# Patient Record
Sex: FEMALE
[De-identification: names, ages, dates, MRNs, and addresses within clinical notes are randomized per-mention and may not be internally consistent; named-entity substitution may affect disease eponyms.]

---

## 2017-12-06 ENCOUNTER — MEDICATION RENEWAL (OUTPATIENT)
Age: 47
End: 2017-12-06

## 2018-02-01 ENCOUNTER — APPOINTMENT (OUTPATIENT)
Dept: ENDOCRINOLOGY | Facility: CLINIC | Age: 48
End: 2018-02-01
Payer: MEDICAID

## 2018-02-01 VITALS
DIASTOLIC BLOOD PRESSURE: 71 MMHG | SYSTOLIC BLOOD PRESSURE: 107 MMHG | OXYGEN SATURATION: 95 % | BODY MASS INDEX: 27.23 KG/M2 | HEIGHT: 62 IN | WEIGHT: 148 LBS | TEMPERATURE: 97.7 F | HEART RATE: 64 BPM

## 2018-02-01 DIAGNOSIS — E55.9 VITAMIN D DEFICIENCY, UNSPECIFIED: ICD-10-CM

## 2018-02-01 PROCEDURE — 99214 OFFICE O/P EST MOD 30 MIN: CPT

## 2018-02-09 ENCOUNTER — CLINICAL ADVICE (OUTPATIENT)
Age: 48
End: 2018-02-09

## 2018-02-09 LAB
25(OH)D3 SERPL-MCNC: 42.6 NG/ML
ALBUMIN SERPL ELPH-MCNC: 4.8 G/DL
ALP BLD-CCNC: 54 U/L
ALT SERPL-CCNC: 23 U/L
ANION GAP SERPL CALC-SCNC: 15 MMOL/L
AST SERPL-CCNC: 29 U/L
BILIRUB SERPL-MCNC: 0.4 MG/DL
BUN SERPL-MCNC: 11 MG/DL
CALCIUM SERPL-MCNC: 10 MG/DL
CHLORIDE SERPL-SCNC: 103 MMOL/L
CHOLEST SERPL-MCNC: 243 MG/DL
CHOLEST/HDLC SERPL: 2.6 RATIO
CO2 SERPL-SCNC: 28 MMOL/L
CREAT SERPL-MCNC: 0.46 MG/DL
FSH SERPL-MCNC: 63.6 IU/L
GLUCOSE SERPL-MCNC: 74 MG/DL
HDLC SERPL-MCNC: 93 MG/DL
LDLC SERPL CALC-MCNC: 110 MG/DL
POTASSIUM SERPL-SCNC: 4.7 MMOL/L
PROLACTIN SERPL-MCNC: 8.6 NG/ML
PROT SERPL-MCNC: 7.7 G/DL
SODIUM SERPL-SCNC: 146 MMOL/L
T4 FREE SERPL-MCNC: 1 NG/DL
TRIGL SERPL-MCNC: 198 MG/DL
TSH SERPL-ACNC: 0.37 UIU/ML

## 2018-05-31 ENCOUNTER — RX RENEWAL (OUTPATIENT)
Age: 48
End: 2018-05-31

## 2018-07-26 ENCOUNTER — RX RENEWAL (OUTPATIENT)
Age: 48
End: 2018-07-26

## 2018-08-24 ENCOUNTER — RX RENEWAL (OUTPATIENT)
Age: 48
End: 2018-08-24

## 2018-08-27 ENCOUNTER — RX RENEWAL (OUTPATIENT)
Age: 48
End: 2018-08-27

## 2019-02-11 ENCOUNTER — RX RENEWAL (OUTPATIENT)
Age: 49
End: 2019-02-11

## 2019-02-28 ENCOUNTER — MEDICATION RENEWAL (OUTPATIENT)
Age: 49
End: 2019-02-28

## 2019-03-15 ENCOUNTER — APPOINTMENT (OUTPATIENT)
Dept: ENDOCRINOLOGY | Facility: CLINIC | Age: 49
End: 2019-03-15
Payer: COMMERCIAL

## 2019-03-15 VITALS
TEMPERATURE: 99 F | OXYGEN SATURATION: 97 % | DIASTOLIC BLOOD PRESSURE: 63 MMHG | HEIGHT: 63 IN | WEIGHT: 151 LBS | BODY MASS INDEX: 26.75 KG/M2 | SYSTOLIC BLOOD PRESSURE: 99 MMHG | HEART RATE: 62 BPM

## 2019-03-15 PROCEDURE — 99214 OFFICE O/P EST MOD 30 MIN: CPT

## 2019-03-15 NOTE — REVIEW OF SYSTEMS
[Fatigue] : no fatigue [Decreased Appetite] : appetite not decreased [Fever] : no fever [Chills] : no chills [FreeTextEntry2] : Has gained 2 lb in weight since her last visit

## 2019-03-15 NOTE — HISTORY OF PRESENT ILLNESS
[FreeTextEntry1] : She now returns after a hiatus of a year.\par Interim history is significant for surgery to remove the soft tissue mass on the right side of her abdominal wall.  She did not go for her post-op visit, and is not sure what kind of tissue it was.\par Says that her BP dropped significantly after the procedure, and has remained low since then--reports these as consistently < 125/80.  She had one spike ??? to 130/112.  She has continued all of her usual medications.\par Her "spells" with episodic sweating, etc have disappeared.  Her feelings of hypervigilance have also disappeared.\par She is no longer able to see her PCP because Dr. Blandon no longer takes her insurance.\par She has continued on the [place] thyroid, though is now paying out of pocket because her insurance will no longer cover it.\par She has not made any significant changes in her diet.\par \par \par

## 2019-03-15 NOTE — REASON FOR VISIT
[Follow-Up: _____] : a [unfilled] follow-up visit [FreeTextEntry1] : hypothyroidism, hypertension and pituitary adenoma

## 2019-03-20 ENCOUNTER — CLINICAL ADVICE (OUTPATIENT)
Age: 49
End: 2019-03-20

## 2019-03-20 LAB
ALBUMIN SERPL ELPH-MCNC: 4.9 G/DL
ALP BLD-CCNC: 56 U/L
ALT SERPL-CCNC: 15 U/L
ANION GAP SERPL CALC-SCNC: 15 MMOL/L
AST SERPL-CCNC: 17 U/L
BILIRUB SERPL-MCNC: 0.2 MG/DL
BUN SERPL-MCNC: 10 MG/DL
CALCIUM SERPL-MCNC: 9.7 MG/DL
CHLORIDE SERPL-SCNC: 102 MMOL/L
CHOLEST SERPL-MCNC: 266 MG/DL
CHOLEST/HDLC SERPL: 3.1 RATIO
CO2 SERPL-SCNC: 26 MMOL/L
CREAT SERPL-MCNC: 0.58 MG/DL
GLUCOSE SERPL-MCNC: 88 MG/DL
HDLC SERPL-MCNC: 87 MG/DL
LDLC SERPL CALC-MCNC: 136 MG/DL
POTASSIUM SERPL-SCNC: 5.1 MMOL/L
PROLACTIN SERPL-MCNC: 4.7 NG/ML
PROT SERPL-MCNC: 7.1 G/DL
SODIUM SERPL-SCNC: 143 MMOL/L
T4 FREE SERPL-MCNC: 0.9 NG/DL
TRIGL SERPL-MCNC: 215 MG/DL
TSH SERPL-ACNC: 1.66 UIU/ML

## 2019-04-01 ENCOUNTER — RX RENEWAL (OUTPATIENT)
Age: 49
End: 2019-04-01

## 2019-04-10 ENCOUNTER — MEDICATION RENEWAL (OUTPATIENT)
Age: 49
End: 2019-04-10

## 2019-12-03 ENCOUNTER — MEDICATION RENEWAL (OUTPATIENT)
Age: 49
End: 2019-12-03

## 2020-02-24 ENCOUNTER — APPOINTMENT (OUTPATIENT)
Dept: ENDOCRINOLOGY | Facility: CLINIC | Age: 50
End: 2020-02-24

## 2020-06-30 ENCOUNTER — APPOINTMENT (OUTPATIENT)
Dept: ENDOCRINOLOGY | Facility: CLINIC | Age: 50
End: 2020-06-30
Payer: SELF-PAY

## 2020-06-30 VITALS
DIASTOLIC BLOOD PRESSURE: 100 MMHG | SYSTOLIC BLOOD PRESSURE: 164 MMHG | HEART RATE: 86 BPM | WEIGHT: 151 LBS | BODY MASS INDEX: 26.75 KG/M2 | HEIGHT: 63 IN | OXYGEN SATURATION: 92 %

## 2020-06-30 DIAGNOSIS — Z83.49 FAMILY HISTORY OF OTHER ENDOCRINE, NUTRITIONAL AND METABOLIC DISEASES: ICD-10-CM

## 2020-06-30 DIAGNOSIS — Z80.0 FAMILY HISTORY OF MALIGNANT NEOPLASM OF DIGESTIVE ORGANS: ICD-10-CM

## 2020-06-30 DIAGNOSIS — Z83.71 FAMILY HISTORY OF COLONIC POLYPS: ICD-10-CM

## 2020-06-30 DIAGNOSIS — E28.319 ASYMPTOMATIC PREMATURE MENOPAUSE: ICD-10-CM

## 2020-06-30 DIAGNOSIS — F17.200 NICOTINE DEPENDENCE, UNSPECIFIED, UNCOMPLICATED: ICD-10-CM

## 2020-06-30 PROCEDURE — 99214 OFFICE O/P EST MOD 30 MIN: CPT

## 2020-07-04 PROBLEM — E28.319 PREMATURE MENOPAUSE: Status: ACTIVE | Noted: 2020-07-04

## 2020-07-04 PROBLEM — Z83.71 FAMILY HISTORY OF COLONIC POLYPS: Status: ACTIVE | Noted: 2020-07-04

## 2020-07-04 PROBLEM — Z80.0 FAMILY HISTORY OF MALIGNANT NEOPLASM OF COLON: Status: ACTIVE | Noted: 2020-07-04

## 2020-07-04 PROBLEM — Z83.49 FAMILY HISTORY OF HASHIMOTO THYROIDITIS: Status: ACTIVE | Noted: 2020-07-04

## 2020-07-04 PROBLEM — F17.200 CURRENT SMOKER: Status: ACTIVE | Noted: 2020-07-04

## 2020-07-04 RX ORDER — METOPROLOL TARTRATE 50 MG/1
50 TABLET, FILM COATED ORAL TWICE DAILY
Qty: 30 | Refills: 11 | Status: DISCONTINUED | COMMUNITY
Start: 2017-12-06 | End: 2020-07-04

## 2020-07-04 RX ORDER — AMLODIPINE BESYLATE 10 MG/1
10 TABLET ORAL TWICE DAILY
Qty: 30 | Refills: 1 | Status: DISCONTINUED | COMMUNITY
Start: 2017-12-06 | End: 2020-07-04

## 2020-07-04 RX ORDER — LOSARTAN POTASSIUM 100 MG/1
100 TABLET, FILM COATED ORAL
Qty: 30 | Refills: 9 | Status: DISCONTINUED | COMMUNITY
Start: 2017-12-06 | End: 2020-07-04

## 2020-07-04 NOTE — HISTORY OF PRESENT ILLNESS
[FreeTextEntry1] : 49-year-old woman who is followed for hypothyroidism, hypertension, hyperlipidemia and a pituitary microadenoma.  The pituitary lesion is 4 mm in size, non-secretory and stable on serial MRIs for the past several years.  Her hypothyroidism is being treated with [place] thyroid, and her hyperlipidemia is sub-optimally controlled with diet.\par \par She now returns after a hiatus of a year.\par Interim history since her last visit:\par --Stopped all of her anti-hypertensives as of September of last year, and says that her BPs were fine during the few weeks after she stopped.  She then lapsed on her monitoring, however, and has actually not checked her BP in several months.  She has not had any recurrence of her previous "spells" which were sometimes associated with spikes in her BP, and thinks that this has probably made her somewhat less concerned.\par --She has not seen a PCP nor had bloodwork done in the past year.\par --She never found out the results of the pathology from the soft tissue mass which was removed from her abdominal wall.\par --Feels quite well, and has been exercising regularly--does resistance work with weights, bands, etc for 40 min every day.  Does not do any outdoor running or other cardio.\par --Has been amenorrheic for 8 years, and has no vasomotor symptoms\par Taking [place] thyroid at least an hour before breakfast.\par Denies any headaches or visual disturbances.  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

## 2020-07-04 NOTE — REVIEW OF SYSTEMS
[Dry Eyes] : dryness [Fatigue] : no fatigue [Decreased Appetite] : appetite not decreased [Recent Weight Gain (___ Lbs)] : no recent weight gain [Recent Weight Loss (___ Lbs)] : no recent weight loss [Fever] : no fever [Chills] : no chills [Blurred Vision] : no blurred vision [Eyes Itch] : no itch [Hearing Loss] : no hearing loss  [Dysphagia] : no dysphagia [Nasal Congestion] : no nasal congestion [Lower Ext Edema] : no lower extremity edema [Chest Pain] : no chest pain [Palpitations] : no palpitations [Cough] : no cough [Shortness Of Breath] : no shortness of breath [Wheezing] : no wheezing [SOB on Exertion] : no shortness of breath on exertion [Nausea] : no nausea [Constipation] : no constipation [Heartburn] : no heartburn [Diarrhea] : no diarrhea [Dysuria] : no dysuria [Nocturia] : no nocturia [Polyuria] : no polyuria [Myalgia] : no myalgia  [Muscle Cramps] : no muscle cramps [Dry Skin] : no dry skin [Hair Loss] : no hair loss [Headaches] : no headaches [Pain/Numbness of Digits] : no pain/numbness of digits [Tremors] : no tremors [Insomnia] : no insomnia [Depression] : no depression [Easy Bleeding] : no ~M tendency for easy bleeding [Polydipsia] : no polydipsia [Stress] : no stress [FreeTextEntry9] : R knee tends to swell a few hours after she runs outdoors, so she has stopped this [Easy Bruising] : no tendency for easy bruising

## 2020-07-04 NOTE — PHYSICAL EXAM
Next OV 05/14/2019  Jj 01/24/2019  Contract 10/30/2018   [Alert] : alert [Well Nourished] : well nourished [Normal Sclera/Conjunctiva] : normal sclera/conjunctiva [Healthy Appearance] : healthy appearance [EOMI] : extra ocular movement intact [PERRL] : pupils equal, round and reactive to light [No Lid Lag] : no lid lag [Normal Outer Ear/Nose] : the ears and nose were normal in appearance [No Proptosis] : no proptosis [No Neck Mass] : no neck mass was observed [No LAD] : no lymphadenopathy [Normal Hearing] : hearing was normal [Clear to Auscultation] : lungs were clear to auscultation bilaterally [Regular Rhythm] : with a regular rhythm [Carotids Normal] : carotid pulses were normal with no bruits [Normal Rate] : heart rate was normal [No Edema] : no peripheral edema [Not Tender] : non-tender [No HSM] : no hepato-splenomegaly [Soft] : abdomen soft [Normal Supraclavicular Nodes] : no supraclavicular lymphadenopathy [Normal Anterior Cervical Nodes] : no anterior cervical lymphadenopathy [No CVA Tenderness] : no ~M costovertebral angle tenderness [Normal Gait] : normal gait [No Joint Swelling] : no joint swelling seen [No Involuntary Movements] : no involuntary movements were seen [No Rash] : no rash [Normal Strength/Tone] : muscle strength and tone were normal [No Tremors] : no tremors [Normal Mood] : the mood was normal [Kyphosis] : no kyphosis present [Acanthosis Nigricans] : no acanthosis nigricans [Foot Ulcers] : no foot ulcers [de-identified] : Visual fields entirely normal to confrontational testing.  Small nodular lesion on the left lower lid which the pt says was a "mole" around which she tied thread to "choke it off" [Normal Affect] : the affect was normal [de-identified] : Thyroid barely palpable [de-identified] : Short IRISH localized to the aortic area [de-identified] : DP pulses 3+ bilaterally [de-identified] : No scalp hair thinning [de-identified] : Vibratory sensation mildly decreased over the toes

## 2020-07-04 NOTE — ASSESSMENT
[FreeTextEntry1] : 1) Hypothyroidism:  She is clinically euthyroid on exam, but has not had TFTs done in the past year.\par --Repeat TFTs today\par --Continue [place] thyroid 120 mg/day pending results\par \par 2) Hypertension:  BP is distinctly elevated on today's exam, and her hypertension has likely persisted off medication.  She clearly should not have discontinued her home monitoring, but presumably did so because her "spells" which were previously associated with spikes in her BP seem to have spontaneously disappeared.  These episodes were not always associated with BP spikes, however, and she also noted elevated BP readings even when she was not having any symptoms.\par --Warned her that it is highly unlikely that her HBP has been "cured," and that she needs to resume monitoring.  Warned her re: risks of CVA, renal damage and accelerated age-related decline in cognitive function.\par \par 3) Hyperlipidemia:  Diet is fairly low in saturated fat, though her intake of eggs is somewhat excessive, and may be affecting her LDL-cholesterol.\par --Repeat lipid profile today\par \par 4) Pituitary adenoma:  Appears to be non-secretory by previous testing, and was stable at 4 mm in size on her most recent MRI in 2016.\par --Would ordinarily repeat her MRI at this point, but she wants to defer due to the cost and her lack of health insurance\par \par Bloodwork to be drawn today--see below\par She is to call back next week to discuss the results and update me regarding her BPs\par See for follow-up in 6-12 months.\par She also needs a screening colonoscopy--partly because of her age, but more because of the history of colon polyps in her mother.  She also declines this due to cost. [FreeTextEntry2] : Diet, home BP monitoring

## 2020-07-14 LAB
ALBUMIN SERPL ELPH-MCNC: 5 G/DL
ALP BLD-CCNC: 66 U/L
ALT SERPL-CCNC: 29 U/L
ANION GAP SERPL CALC-SCNC: 17 MMOL/L
AST SERPL-CCNC: 30 U/L
BASOPHILS # BLD AUTO: 0.04 K/UL
BASOPHILS NFR BLD AUTO: 0.7 %
BILIRUB SERPL-MCNC: 0.3 MG/DL
BUN SERPL-MCNC: 5 MG/DL
CALCIUM SERPL-MCNC: 9.8 MG/DL
CHLORIDE SERPL-SCNC: 107 MMOL/L
CHOLEST SERPL-MCNC: 249 MG/DL
CHOLEST/HDLC SERPL: 3 RATIO
CO2 SERPL-SCNC: 23 MMOL/L
CREAT SERPL-MCNC: 0.46 MG/DL
EOSINOPHIL # BLD AUTO: 0.15 K/UL
EOSINOPHIL NFR BLD AUTO: 2.7 %
GLUCOSE SERPL-MCNC: 99 MG/DL
HCT VFR BLD CALC: 47.1 %
HDLC SERPL-MCNC: 82 MG/DL
HGB BLD-MCNC: 15.9 G/DL
IMM GRANULOCYTES NFR BLD AUTO: 0.4 %
LDLC SERPL CALC-MCNC: 131 MG/DL
LYMPHOCYTES # BLD AUTO: 1.78 K/UL
LYMPHOCYTES NFR BLD AUTO: 31.5 %
MAN DIFF?: NORMAL
MCHC RBC-ENTMCNC: 32.5 PG
MCHC RBC-ENTMCNC: 33.8 GM/DL
MCV RBC AUTO: 96.3 FL
MONOCYTES # BLD AUTO: 0.37 K/UL
MONOCYTES NFR BLD AUTO: 6.5 %
NEUTROPHILS # BLD AUTO: 3.29 K/UL
NEUTROPHILS NFR BLD AUTO: 58.2 %
PLATELET # BLD AUTO: 200 K/UL
POTASSIUM SERPL-SCNC: 4.8 MMOL/L
PROT SERPL-MCNC: 7.1 G/DL
RBC # BLD: 4.89 M/UL
RBC # FLD: 11.8 %
SODIUM SERPL-SCNC: 147 MMOL/L
TRIGL SERPL-MCNC: 181 MG/DL
TSH SERPL-ACNC: 0.28 UIU/ML
WBC # FLD AUTO: 5.65 K/UL

## 2020-07-27 ENCOUNTER — RX RENEWAL (OUTPATIENT)
Age: 50
End: 2020-07-27

## 2021-07-13 DIAGNOSIS — Z00.00 ENCOUNTER FOR GENERAL ADULT MEDICAL EXAMINATION W/OUT ABNORMAL FINDINGS: ICD-10-CM

## 2021-07-19 ENCOUNTER — APPOINTMENT (OUTPATIENT)
Dept: ENDOCRINOLOGY | Facility: CLINIC | Age: 51
End: 2021-07-19
Payer: SELF-PAY

## 2021-07-19 PROCEDURE — 36415 COLL VENOUS BLD VENIPUNCTURE: CPT

## 2021-07-20 LAB
ALBUMIN SERPL ELPH-MCNC: 4.9 G/DL
ALP BLD-CCNC: 70 U/L
ALT SERPL-CCNC: 34 U/L
ANION GAP SERPL CALC-SCNC: 16 MMOL/L
AST SERPL-CCNC: 35 U/L
BASOPHILS # BLD AUTO: 0.04 K/UL
BASOPHILS NFR BLD AUTO: 0.6 %
BILIRUB SERPL-MCNC: 0.4 MG/DL
BUN SERPL-MCNC: 5 MG/DL
CALCIUM SERPL-MCNC: 9.7 MG/DL
CHLORIDE SERPL-SCNC: 104 MMOL/L
CHOLEST SERPL-MCNC: 239 MG/DL
CO2 SERPL-SCNC: 22 MMOL/L
CREAT SERPL-MCNC: 0.5 MG/DL
EOSINOPHIL # BLD AUTO: 0.18 K/UL
EOSINOPHIL NFR BLD AUTO: 2.7 %
GLUCOSE SERPL-MCNC: 89 MG/DL
HCT VFR BLD CALC: 47 %
HDLC SERPL-MCNC: 73 MG/DL
HGB BLD-MCNC: 15.5 G/DL
IMM GRANULOCYTES NFR BLD AUTO: 0.2 %
LDLC SERPL CALC-MCNC: 145 MG/DL
LYMPHOCYTES # BLD AUTO: 2.32 K/UL
LYMPHOCYTES NFR BLD AUTO: 34.9 %
MAN DIFF?: NORMAL
MCHC RBC-ENTMCNC: 32.2 PG
MCHC RBC-ENTMCNC: 33 GM/DL
MCV RBC AUTO: 97.5 FL
MONOCYTES # BLD AUTO: 0.43 K/UL
MONOCYTES NFR BLD AUTO: 6.5 %
NEUTROPHILS # BLD AUTO: 3.67 K/UL
NEUTROPHILS NFR BLD AUTO: 55.1 %
NONHDLC SERPL-MCNC: 166 MG/DL
PLATELET # BLD AUTO: 205 K/UL
POTASSIUM SERPL-SCNC: 4.7 MMOL/L
PROT SERPL-MCNC: 7 G/DL
RBC # BLD: 4.82 M/UL
RBC # FLD: 12.2 %
SODIUM SERPL-SCNC: 142 MMOL/L
T4 FREE SERPL-MCNC: 1 NG/DL
TRIGL SERPL-MCNC: 103 MG/DL
TSH SERPL-ACNC: 0.18 UIU/ML
WBC # FLD AUTO: 6.65 K/UL

## 2021-07-21 ENCOUNTER — APPOINTMENT (OUTPATIENT)
Dept: ENDOCRINOLOGY | Facility: CLINIC | Age: 51
End: 2021-07-21
Payer: SELF-PAY

## 2021-07-21 VITALS
SYSTOLIC BLOOD PRESSURE: 143 MMHG | WEIGHT: 152 LBS | TEMPERATURE: 97.7 F | BODY MASS INDEX: 26.93 KG/M2 | HEART RATE: 76 BPM | DIASTOLIC BLOOD PRESSURE: 90 MMHG | HEIGHT: 63 IN | OXYGEN SATURATION: 95 %

## 2021-07-21 PROCEDURE — 99214 OFFICE O/P EST MOD 30 MIN: CPT

## 2021-07-25 NOTE — REVIEW OF SYSTEMS
[Eyes Itch] : itch [Dry Skin] : dry skin [Stress] : stress [Fatigue] : no fatigue [Decreased Appetite] : appetite not decreased [Recent Weight Gain (___ Lbs)] : no recent weight gain [Recent Weight Loss (___ Lbs)] : no recent weight loss [Fever] : no fever [Chills] : no chills [Dry Eyes] : no dryness [Blurred Vision] : no blurred vision [Dysphagia] : no dysphagia [Hearing Loss] : no hearing loss  [Chest Pain] : no chest pain [Palpitations] : no palpitations [Lower Ext Edema] : no lower extremity edema [Shortness Of Breath] : no shortness of breath [Cough] : no cough [Wheezing] : no wheezing [SOB on Exertion] : no shortness of breath on exertion [Nausea] : no nausea [Constipation] : no constipation [Heartburn] : no heartburn [Diarrhea] : no diarrhea [Polyuria] : no polyuria [Dysuria] : no dysuria [Nocturia] : no nocturia [Joint Pain] : no joint pain [Muscle Weakness] : no muscle weakness [Myalgia] : no myalgia  [Joint Stiffness] : no joint stiffness [Hair Loss] : no hair loss [Ulcer] : no ulcer [Headaches] : no headaches [Tremors] : no tremors [Pain/Numbness of Digits] : no pain/numbness of digits [Depression] : no depression [Anxiety] : no anxiety [Polydipsia] : no polydipsia [Cold Intolerance] : no cold intolerance [Heat Intolerance] : no heat intolerance [Easy Bleeding] : no ~M tendency for easy bleeding [Easy Bruising] : no tendency for easy bruising

## 2021-07-25 NOTE — HISTORY OF PRESENT ILLNESS
[FreeTextEntry1] : 49-year-old woman who is followed for hypothyroidism, hypertension, hyperlipidemia and a pituitary microadenoma.  The pituitary lesion is 4 mm in size, non-secretory and stable on serial MRIs for the past several years.  Her hypothyroidism is being treated with [place] thyroid, and her hyperlipidemia is sub-optimally controlled with diet.\par \par She now returns after a hiatus of a year.  \par Her only intercurrent medical problems were a ?? abscess on the left side of her face (above her mouth) and a pustule on her left ear lobe (which healed after it drained spontaneously).\par Has been taking the losartan (50 mg once a day), but had not been checking any BPs until the past week.  The two readings which she took were 125/85 and 115/80.\par Her weight is about the same.\par Taking her thyroid medication a full hour before breakfast\par Ciet reviewed:\par --Breakfast is eggs and sauteed vegetables\par --Second meal is at 4 PM.  Protein at supper is fish 4-5X/week, otherwise tofu.  Has salad and/or cooked vegetables, but no starch.\par --Has yogurt most days as part of her salad dressing\par --Does not snack in the evening\par --does not eat any baked goods or frozen desserts\par Received both doses of (Pfizer) COVD vaccine in April.  Had only fatigue after her second injection.\par Exercise is 1 hour on a "gazelle" (the equivalent of a Nordic-Track) plus 30 min of calisthenics\par Still smoking 1/2 ppd.\par \par

## 2021-07-25 NOTE — ASSESSMENT
[FreeTextEntry1] : 1) Hypothyroidism:\par \par 2) Hyperlipidemia\par \par 3) Hypertension\par \par 4) Family history of colon polyps

## 2022-04-19 ENCOUNTER — RX RENEWAL (OUTPATIENT)
Age: 52
End: 2022-04-19

## 2022-07-20 ENCOUNTER — RX RENEWAL (OUTPATIENT)
Age: 52
End: 2022-07-20

## 2022-07-25 ENCOUNTER — APPOINTMENT (OUTPATIENT)
Dept: ENDOCRINOLOGY | Facility: CLINIC | Age: 52
End: 2022-07-25

## 2022-07-25 ENCOUNTER — APPOINTMENT (OUTPATIENT)
Dept: INTERNAL MEDICINE | Facility: CLINIC | Age: 52
End: 2022-07-25

## 2022-07-25 PROCEDURE — 36415 COLL VENOUS BLD VENIPUNCTURE: CPT

## 2022-07-26 LAB
BASOPHILS # BLD AUTO: 0.04 K/UL
BASOPHILS NFR BLD AUTO: 0.7 %
EOSINOPHIL # BLD AUTO: 0.15 K/UL
EOSINOPHIL NFR BLD AUTO: 2.6 %
HCT VFR BLD CALC: 44 %
HGB BLD-MCNC: 14.8 G/DL
IMM GRANULOCYTES NFR BLD AUTO: 0.2 %
LYMPHOCYTES # BLD AUTO: 2.01 K/UL
LYMPHOCYTES NFR BLD AUTO: 35.1 %
MAN DIFF?: NORMAL
MCHC RBC-ENTMCNC: 33 PG
MCHC RBC-ENTMCNC: 33.6 GM/DL
MCV RBC AUTO: 98.2 FL
MONOCYTES # BLD AUTO: 0.34 K/UL
MONOCYTES NFR BLD AUTO: 5.9 %
NEUTROPHILS # BLD AUTO: 3.17 K/UL
NEUTROPHILS NFR BLD AUTO: 55.5 %
PLATELET # BLD AUTO: 161 K/UL
RBC # BLD: 4.48 M/UL
RBC # FLD: 12.2 %
WBC # FLD AUTO: 5.72 K/UL

## 2022-07-27 ENCOUNTER — APPOINTMENT (OUTPATIENT)
Dept: ENDOCRINOLOGY | Facility: CLINIC | Age: 52
End: 2022-07-27

## 2022-07-29 ENCOUNTER — APPOINTMENT (OUTPATIENT)
Dept: ENDOCRINOLOGY | Facility: CLINIC | Age: 52
End: 2022-07-29

## 2022-07-29 PROCEDURE — 36415 COLL VENOUS BLD VENIPUNCTURE: CPT

## 2022-08-01 LAB
ALBUMIN SERPL ELPH-MCNC: 5 G/DL
ALP BLD-CCNC: 62 U/L
ALT SERPL-CCNC: 22 U/L
ANION GAP SERPL CALC-SCNC: 15 MMOL/L
AST SERPL-CCNC: 26 U/L
BILIRUB SERPL-MCNC: 0.3 MG/DL
BUN SERPL-MCNC: 7 MG/DL
CALCIUM SERPL-MCNC: 9.4 MG/DL
CHLORIDE SERPL-SCNC: 104 MMOL/L
CHOLEST SERPL-MCNC: 276 MG/DL
CO2 SERPL-SCNC: 26 MMOL/L
CREAT SERPL-MCNC: 0.49 MG/DL
EGFR: 113 ML/MIN/1.73M2
GLUCOSE SERPL-MCNC: 93 MG/DL
HDLC SERPL-MCNC: 93 MG/DL
LDLC SERPL CALC-MCNC: 154 MG/DL
NONHDLC SERPL-MCNC: 183 MG/DL
POTASSIUM SERPL-SCNC: 4.7 MMOL/L
PROT SERPL-MCNC: 7 G/DL
SODIUM SERPL-SCNC: 145 MMOL/L
T4 FREE SERPL-MCNC: 1 NG/DL
TRIGL SERPL-MCNC: 145 MG/DL
TSH SERPL-ACNC: 0.95 UIU/ML

## 2022-08-09 ENCOUNTER — APPOINTMENT (OUTPATIENT)
Dept: ENDOCRINOLOGY | Facility: CLINIC | Age: 52
End: 2022-08-09

## 2022-08-09 VITALS
WEIGHT: 146 LBS | DIASTOLIC BLOOD PRESSURE: 97 MMHG | OXYGEN SATURATION: 98 % | SYSTOLIC BLOOD PRESSURE: 142 MMHG | HEART RATE: 94 BPM | RESPIRATION RATE: 16 BRPM | HEIGHT: 63 IN | TEMPERATURE: 97.7 F | BODY MASS INDEX: 25.87 KG/M2

## 2022-08-09 DIAGNOSIS — Z80.0 FAMILY HISTORY OF MALIGNANT NEOPLASM OF DIGESTIVE ORGANS: ICD-10-CM

## 2022-08-09 DIAGNOSIS — F17.210 NICOTINE DEPENDENCE, CIGARETTES, UNCOMPLICATED: ICD-10-CM

## 2022-08-09 DIAGNOSIS — D35.2 BENIGN NEOPLASM OF PITUITARY GLAND: ICD-10-CM

## 2022-08-09 PROCEDURE — 99214 OFFICE O/P EST MOD 30 MIN: CPT

## 2022-08-09 NOTE — ASSESSMENT
[FreeTextEntry1] : 1) Hypothyroidism:  TSH level is in the optimal range of 0.4-2.5 uU/ml on a full (120 mg) [place] thyroid tablet          7 days/week.\par --Continue 120 mg daily\par --Explained that her "not feeling right" on the days when she took half a pill probably had to do with her missing the  "activating" effect of the large amount of T3 in the preparation\par \par 2) Hyperlipidemia:  LDL-cholesterol target should probably be regarded as 100 mg% given her risk factors of hypertension and cigarette smoking.  She is therefore well above goal, and does not have significant room for further dietary modification.  She is a candidate for statin therapy, but does not want to take medication.\par --Again discussed the LDL target of 100 mg%, and my recommendation that she start on a statin.  Emphasized that although her HDL level is quite high, it may not be as protective as it seems.  She remains reluctant to take a stating.\par \par 3) Hypertension:  BP is mildly elevated at today's visit, but is also similar to the values which she has been getting on her machine at home.\par --Explained that she is above the goal of < 140/85, and the risks of stroke, potential acceleration of age-related cognitive decline, etc.  She is willing to start back on medication.\par Plans discussed in detail:\par --Will start back on losartan at 50 mg/day\par --She will let me know if any side-effects--(i.e. recurrence of the arthralgias, etc)\par --If she remains above goal at 50 mg/day, will increase to 50 mg BID.\par --If still unsuccessful, will decrease back to 50 mg/day and add chlorthalidone 12.5 mg/day\par \par 4) Pituitary microadenoma:  Last MRI several years ago showed a stable 4 mm microadenoma.  Previous hormonal work-up showed the tumor to be clinically and biochemically non-secretory.  \par --Although she would ordinarily be a candidate for an updated MRI, the cost of the study is an issue\par \par 5) Polycythemia: Hb level on the current bloodwork (14.8 gm/dl) is only slightly above the normal female range.\par --Continue to follow\par \par 6) Family history of colon CA:  Has never had a colonoscopy, and cannot do one because of the out-of-pocket cost.\par --Will consider doing Cologard as an alternative\par --Will see if GI Dept has a program for reduced-cost procedures\par \par See for follow-up in 1 year.  CMP, lipids, TFTs, CBC before the visit [FreeTextEntry2] : BP and lipid targets

## 2022-08-09 NOTE — DATA REVIEWED
[FreeTextEntry1] : [facility] labs  (7/25/22, 7/29/22)\par \par Glucose (non-fasting)  93\par CMP WNL\par , HDL 93, \par TSH 0.95 uU/ml  (0.27-4.2)

## 2022-08-09 NOTE — PHYSICAL EXAM
[Alert] : alert [Well Nourished] : well nourished [Healthy Appearance] : healthy appearance [PERRL] : pupils equal, round and reactive to light [EOMI] : extra ocular movement intact [No Proptosis] : no proptosis [No Lid Lag] : no lid lag [Normal Outer Ear/Nose] : the ears and nose were normal in appearance [Normal Hearing] : hearing was normal [No Neck Mass] : no neck mass was observed [No LAD] : no lymphadenopathy [No Thyroid Nodules] : no palpable thyroid nodules [Clear to Auscultation] : lungs were clear to auscultation bilaterally [No Murmurs] : no murmurs [Normal Rate] : heart rate was normal [Regular Rhythm] : with a regular rhythm [Carotids Normal] : carotid pulses were normal with no bruits [No Edema] : no peripheral edema [Not Tender] : non-tender [No HSM] : no hepato-splenomegaly [Normal Supraclavicular Nodes] : no supraclavicular lymphadenopathy [Normal Anterior Cervical Nodes] : no anterior cervical lymphadenopathy [No CVA Tenderness] : no ~M costovertebral angle tenderness [Normal Gait] : normal gait [No Involuntary Movements] : no involuntary movements were seen [No Joint Swelling] : no joint swelling seen [Normal Strength/Tone] : muscle strength and tone were normal [No Rash] : no rash [No Motor Deficits] : the motor exam was normal [No Tremors] : no tremors [Normal Affect] : the affect was normal [Normal Mood] : the mood was normal [Kyphosis] : no kyphosis present [Acanthosis Nigricans] : no acanthosis nigricans [Foot Ulcers] : no foot ulcers [Hirsutism] : no hirsutism [de-identified] : Mild conjunctival injection OU.  Visual fields normal to confrontational testing [de-identified] : Thyroid barely palpable [de-identified] : DP pulses 3+ bilaterally [de-identified] : Exam limited by muscle tension.  No hepatomegaly by percussion [de-identified] : Vibratory sensation mildly decreased over the toes

## 2022-08-09 NOTE — REVIEW OF SYSTEMS
[Eyes Itch] : itch [Fatigue] : no fatigue [Decreased Appetite] : appetite not decreased [Fever] : no fever [Chills] : no chills [Dry Eyes] : no dryness [Blurred Vision] : no blurred vision [Dysphagia] : no dysphagia [Hearing Loss] : no hearing loss  [Chest Pain] : no chest pain [Palpitations] : no palpitations [Lower Ext Edema] : no lower extremity edema [Shortness Of Breath] : no shortness of breath [Cough] : no cough [Wheezing] : no wheezing [SOB on Exertion] : no shortness of breath on exertion [Nausea] : no nausea [Constipation] : no constipation [Heartburn] : no heartburn [Diarrhea] : no diarrhea [Polyuria] : no polyuria [Dysuria] : no dysuria [Nocturia] : no nocturia [Joint Pain] : no joint pain [Muscle Weakness] : no muscle weakness [Myalgia] : no myalgia  [Joint Stiffness] : no joint stiffness [Dry Skin] : no dry skin [Hair Loss] : no hair loss [Ulcer] : no ulcer [Headaches] : no headaches [Tremors] : no tremors [Pain/Numbness of Digits] : no pain/numbness of digits [Depression] : no depression [Anxiety] : no anxiety [Stress] : no stress [Polydipsia] : no polydipsia [Cold Intolerance] : no cold intolerance [Heat Intolerance] : no heat intolerance [Easy Bleeding] : no ~M tendency for easy bleeding [Easy Bruising] : no tendency for easy bruising [FreeTextEntry2] : Has lost 6 lb since her last visit [FreeTextEntry7] : Had diarrhea while on the antibiotics for her dental infection, but bowel pattern is now back to normal [FreeTextEntry8] : Has been amenorrheic since her early 40s [FreeTextEntry9] : Swelling and pain in her knees has resolved [de-identified] : Has a "nodule" on her mid-back which has been present for several months

## 2022-08-09 NOTE — HISTORY OF PRESENT ILLNESS
[FreeTextEntry1] : 51-year-old woman who is followed for hypothyroidism, hypertension, hyperlipidemia and a pituitary microadenoma.  The pituitary lesion is 4 mm in size, non-secretory and has been stable on serial MRIs for the past several years.  Her hypothyroidism is being treated with [place] thyroid, and her hyperlipidemia is sub-optimally controlled with diet.\par \par She now returns after a hiatus of a year.  \par Interim history since her last visit is significant primarily for extensive dental work.  Had a chronic dental abscess for which she had not sought care because of the pandemic.  She finally had it drained, but needed 6 weeks of antibiotic therapy because of the extent of the abscess.  She also required multiple tooth extractions, and had placement of a permanent bridge rather than implants.  She has not seen any physicians since her last visit here.  Her last Gyn exam was over 5 years ago.\lindsey Has been taking a full  [place] thyroid tablet 7 days/week--(i.e.not the half pill on Sunday).  Says that she did not feel right on the day that she took the half pill\par She also stopped the losartan because she thought it was causing arthritis in her knees.  (She actually had swelling in both knees, and thought that she had visible swelling in her hips).\par BPs at home have been mostly less than 140 systolic, and almost never over 150.  Diastolics have been 88-97.  Says that her readings were no different when she was on the losartan..\lindsey Has lost 6 lb since her last visit, though does not think that her diet has changed..\par --Is still pescatarian.  Also avoids all dairy except for yogurt\par --Breakfast is 2 eggs and roasted vegetables (with tahini)\par --Lunch (which is the main meal of the day) is soup, fish, sometimes tuna.  Starch usually is included in the soup, otherwise will have bread or a potato\par --Will often just have a snack instead of third meal--hummus with celery sticks, sandwich with vegetables/tahini.\par --Has fruit twice a day--most recently watermelon, leila or dragon fruit\par Still smoking 1/2-1 ppd\lindsey Has never had a colonoscopy despite the family history of colon CA (grandmother) and colon polyps (mother)

## 2022-08-15 ENCOUNTER — NON-APPOINTMENT (OUTPATIENT)
Age: 52
End: 2022-08-15

## 2022-11-01 ENCOUNTER — NON-APPOINTMENT (OUTPATIENT)
Age: 52
End: 2022-11-01

## 2022-11-08 ENCOUNTER — RX RENEWAL (OUTPATIENT)
Age: 52
End: 2022-11-08

## 2023-09-28 ENCOUNTER — APPOINTMENT (OUTPATIENT)
Dept: ENDOCRINOLOGY | Facility: CLINIC | Age: 53
End: 2023-09-28
Payer: SELF-PAY

## 2023-09-28 VITALS
DIASTOLIC BLOOD PRESSURE: 101 MMHG | SYSTOLIC BLOOD PRESSURE: 147 MMHG | HEART RATE: 82 BPM | WEIGHT: 149 LBS | OXYGEN SATURATION: 98 % | HEIGHT: 63 IN | BODY MASS INDEX: 26.4 KG/M2 | TEMPERATURE: 97.6 F

## 2023-09-28 DIAGNOSIS — E03.9 HYPOTHYROIDISM, UNSPECIFIED: ICD-10-CM

## 2023-09-28 DIAGNOSIS — I10 ESSENTIAL (PRIMARY) HYPERTENSION: ICD-10-CM

## 2023-09-28 DIAGNOSIS — D75.1 SECONDARY POLYCYTHEMIA: ICD-10-CM

## 2023-09-28 DIAGNOSIS — E78.2 MIXED HYPERLIPIDEMIA: ICD-10-CM

## 2023-09-28 PROCEDURE — 99213 OFFICE O/P EST LOW 20 MIN: CPT

## 2023-09-28 RX ORDER — LOSARTAN POTASSIUM 100 MG/1
100 TABLET, FILM COATED ORAL
Qty: 50 | Refills: 4 | Status: ACTIVE | COMMUNITY
Start: 2020-07-14 | End: 1900-01-01

## 2023-09-28 RX ORDER — CHLORTHALIDONE 25 MG/1
25 TABLET ORAL
Qty: 30 | Refills: 10 | Status: ACTIVE | COMMUNITY
Start: 2022-08-22 | End: 1900-01-01

## 2023-09-28 RX ORDER — THYROID, PORCINE 120 MG/1
120 TABLET ORAL
Qty: 30 | Refills: 10 | Status: ACTIVE | COMMUNITY
Start: 2017-12-06 | End: 1900-01-01

## 2023-10-01 PROBLEM — I10 ESSENTIAL HYPERTENSION: Status: ACTIVE | Noted: 2017-12-06

## 2023-10-01 PROBLEM — E78.2 MIXED HYPERLIPIDEMIA: Status: ACTIVE | Noted: 2018-02-01

## 2023-10-01 PROBLEM — E03.9 PRIMARY HYPOTHYROIDISM: Status: ACTIVE | Noted: 2017-12-06

## 2023-10-01 PROBLEM — D75.1 POLYCYTHEMIA: Status: ACTIVE | Noted: 2022-07-18

## 2024-08-29 ENCOUNTER — APPOINTMENT (OUTPATIENT)
Dept: ENDOCRINOLOGY | Facility: CLINIC | Age: 54
End: 2024-08-29
Payer: SELF-PAY

## 2024-08-29 VITALS
OXYGEN SATURATION: 94 % | SYSTOLIC BLOOD PRESSURE: 139 MMHG | TEMPERATURE: 96.5 F | BODY MASS INDEX: 26.4 KG/M2 | DIASTOLIC BLOOD PRESSURE: 93 MMHG | HEART RATE: 91 BPM | WEIGHT: 149 LBS | HEIGHT: 63 IN

## 2024-08-29 DIAGNOSIS — E78.2 MIXED HYPERLIPIDEMIA: ICD-10-CM

## 2024-08-29 DIAGNOSIS — I10 ESSENTIAL (PRIMARY) HYPERTENSION: ICD-10-CM

## 2024-08-29 DIAGNOSIS — D75.1 SECONDARY POLYCYTHEMIA: ICD-10-CM

## 2024-08-29 DIAGNOSIS — R73.03 PREDIABETES.: ICD-10-CM

## 2024-08-29 DIAGNOSIS — D35.2 BENIGN NEOPLASM OF PITUITARY GLAND: ICD-10-CM

## 2024-08-29 DIAGNOSIS — E03.9 HYPOTHYROIDISM, UNSPECIFIED: ICD-10-CM

## 2024-08-29 PROCEDURE — 99214 OFFICE O/P EST MOD 30 MIN: CPT

## 2024-09-02 PROBLEM — R73.03 PRE-DIABETES: Status: ACTIVE | Noted: 2024-08-29

## 2024-09-03 NOTE — REVIEW OF SYSTEMS
[Stress] : stress [Fatigue] : no fatigue [Decreased Appetite] : appetite not decreased [Recent Weight Gain (___ Lbs)] : no recent weight gain [Recent Weight Loss (___ Lbs)] : no recent weight loss [Fever] : no fever [Chills] : no chills [Dry Eyes] : no dryness [Blurred Vision] : no blurred vision [Eyes Itch] : no itch [Dysphagia] : no dysphagia [Hearing Loss] : no hearing loss  [Chest Pain] : no chest pain [Palpitations] : no palpitations [Lower Ext Edema] : no lower extremity edema [Shortness Of Breath] : no shortness of breath [Cough] : no cough [Wheezing] : no wheezing [SOB on Exertion] : no shortness of breath on exertion [Nausea] : no nausea [Constipation] : no constipation [Heartburn] : no heartburn [Diarrhea] : no diarrhea [Polyuria] : no polyuria [Dysuria] : no dysuria [Nocturia] : no nocturia [Joint Pain] : no joint pain [Muscle Weakness] : no muscle weakness [Myalgia] : no myalgia  [Joint Stiffness] : no joint stiffness [Dry Skin] : no dry skin [Hair Loss] : no hair loss [Ulcer] : no ulcer [Headaches] : no headaches [Tremors] : no tremors [Pain/Numbness of Digits] : no pain/numbness of digits [Depression] : no depression [Anxiety] : no anxiety [Polydipsia] : no polydipsia [Cold Intolerance] : no cold intolerance [Heat Intolerance] : no heat intolerance [Easy Bleeding] : no ~M tendency for easy bleeding [Easy Bruising] : no tendency for easy bruising [FreeTextEntry7] : Tends to eat multiple small meals 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

## 2024-09-03 NOTE — HISTORY OF PRESENT ILLNESS
[FreeTextEntry1] : 53-year-old woman who is followed for hypothyroidism, hypertension, hyperlipidemia and a pituitary microadenoma.  The pituitary lesion is 4 mm in size, non-secretory and has been stable on serial MRIs for the past several years.  Her hypothyroidism is being treated with [place] thyroid, and her hyperlipidemia is sub-optimally controlled with diet.  Her hypertension is somewhat labile, and although she tends to have elevated readings at office visits, her readings at home are now in target range.      She now returns for her yearly visit. Has not had any acute illnesses or significant medical issues in the interim.   Is supposed to be taking the [place] thyroid 6 1/2 pills/week, but is still taking 1 pill 7 days/week.  Says that she would tend to fall asleep in the afternoon on the days when she tried to take half a tablet Taking chlorthalidone 12.5 mg plus losartan 50 mg/day for her hypertension.  Says that most of her BPs (which she checks on a watch) have been < 130/80, and often in the 110-120/65-75 range.  Readings have been higher during the past 2 seeks, but still below 140/83. Diet was reviewed:: --Breakfast is cooked vegetables with 1 egg. --Lunch is a salad with grilled salmon --Supper is usually leftover salad from lunch --Fruit servings average less than one a day, and she does not drink juice Changed her exercise routine.  Stopped running on the treadmill because she developed pain in her hip and upper thigh, also some swelling over the patella.  Is now doing mostly calisthenics (pushups, crunches, etc) plus yoga and exercises with hand weights.

## 2024-09-03 NOTE — PHYSICAL EXAM
[Alert] : alert [Well Nourished] : well nourished [Healthy Appearance] : healthy appearance [Normal Sclera/Conjunctiva] : normal sclera/conjunctiva [EOMI] : extra ocular movement intact [PERRL] : pupils equal, round and reactive to light [No Proptosis] : no proptosis [No Lid Lag] : no lid lag [Normal Outer Ear/Nose] : the ears and nose were normal in appearance [Normal Hearing] : hearing was normal [No Neck Mass] : no neck mass was observed [No LAD] : no lymphadenopathy [No Thyroid Nodules] : no palpable thyroid nodules [Clear to Auscultation] : lungs were clear to auscultation bilaterally [No Murmurs] : no murmurs [Normal Rate] : heart rate was normal [Regular Rhythm] : with a regular rhythm [Carotids Normal] : carotid pulses were normal with no bruits [No Edema] : no peripheral edema [Not Tender] : non-tender [Soft] : abdomen soft [No HSM] : no hepato-splenomegaly [Normal Supraclavicular Nodes] : no supraclavicular lymphadenopathy [Normal Anterior Cervical Nodes] : no anterior cervical lymphadenopathy [No CVA Tenderness] : no ~M costovertebral angle tenderness [Normal Gait] : normal gait [No Involuntary Movements] : no involuntary movements were seen [No Joint Swelling] : no joint swelling seen [Normal Strength/Tone] : muscle strength and tone were normal [No Rash] : no rash [No Motor Deficits] : the motor exam was normal [No Tremors] : no tremors [Normal Affect] : the affect was normal [Normal Mood] : the mood was normal [Kyphosis] : no kyphosis present [Acanthosis Nigricans] : no acanthosis nigricans [Foot Ulcers] : no foot ulcers [Hirsutism] : no hirsutism [de-identified] : No corneal arcus.  Visual fields normal to confrontational testing [de-identified] : Thyroid is barely palpable [de-identified] : DP pulses 2+ on the left, only faintly palpable on the right [de-identified] : Area under the MT head of her R foot where she has a foreign body sensation is heavily callused

## 2024-09-03 NOTE — DATA REVIEWED
[FreeTextEntry1] : [facility]  (8/23/24)  CMP--, otherwise normal , HDL 70, TG 76 CBC WNL TSH level not done (though was on the requisition)

## 2024-09-03 NOTE — ASSESSMENT
[FreeTextEntry2] : Implications of elevated FBS, BP targets, LDL target and possible statin therapy [FreeTextEntry1] : 1) Pre-diabetes:  FBS has risen to 125 mg%, actually at the threshold for true diabetes.  No A1c level is available.  Family history is negative.  Her diet is fine, she exercises regularly, and she is non-obese.   --To continue diet and exercise --Will check an A1c level  2) Hyperlipidemia:  She now has a "hard" LDL target of 100 mg% given the pre-diabetes, (although this target was previously justified by her risk factors of hypertension and cigarette smoking).  Her diet leaves almost no room for additional modification, and she is clearly a candidate for statin therapy. --Discussed her LDL target and indication for statin therapy, which she has previously refused.  She says that she will think about it.  3) Hypothyroidism:  TFTs were not done on the current labwork, but were added via a phone call to [facility] last night. --Will await the result of her TSH level, and discuss this with her next week over the phone. --Explained to her that it is very difficult to "fine-tune" her regimen, since the T3 content of the [place] preparation is quite high, and she may feel somewhat different on a day when she takes a lower dose.  4) Hypertension:  BP was again elevated at her office visit, though her readings at home seem to be excellent.  She is monitoring these with her watch, but says that she has confirmed the accuracy of that device by checking it against her regular cuff. --Continue losartan 50 mg/day plus chlorthalidone 12.5 mg/day  5) Polycythemia:  Hb level has now decreased to the upper limit of the female normal range.  The mild polycythemia may be due to the pulmonary effects of her cigarette smoking --Continue to follow.  (She does not intend to stop smoking)  6) Pituitary microadenoma:  Has not had a follow-up MRI in several years because of the cost and her lack of insurance. --Will recheck a prolactin level  To check an A1c and prolactin level next week Discuss TSH results next week over the phone See for follow-up in one year--(sooner if her blood sugars are an issue) CMP, lipids, A1c, TFTs before the visit Discussed her insurance issues.  The insurance which is available at work has an $8000 deductible, so she has not opted for it.   Suggested that she check whether this deductible is only for out-of-network services

## 2024-09-03 NOTE — ASSESSMENT
[FreeTextEntry2] : Implications of elevated FBS, BP targets, LDL target and possible statin therapy [FreeTextEntry1] : 1) Pre-diabetes:  FBS has risen to 125 mg%, actually at the threshold for true diabetes.  No A1c level is available.  Family history is negative.  Her diet is fine, she exercises regularly, and she is non-obese.   --To continue diet and exercise --Will check an A1c level  2) Hyperlipidemia:  She now has a "hard" LDL target of 100 mg% given the pre-diabetes, (although this target was previously justified by her risk factors of hypertension and cigarette smoking).  Her diet leaves almost no room for additional modification, and she is clearly a candidate for statin therapy. --Discussed her LDL target and indication for statin therapy, which she has previously refused.  She says that she will think about it.  3) Hypothyroidism:  TFTs were not done on the current labwork, but were added via a phone call to [facility] last night. --Will await the result of her TSH level, and discuss this with her next week over the phone. --Explained to her that it is very difficult to "fine-tune" her regimen, since the T3 content of the [place] preparation is quite high, and she may feel somewhat different on a day when she takes a lower dose.  4) Hypertension:  BP was again elevated at her office visit, though her readings at home seem to be excellent.  She is monitoring these with her watch, but says that she has confirmed the accuracy of that device by checking it against her regular cuff. --Continue losartan 50 mg/day plus chlorthalidone 12.5 mg/day  5) Polycythemia:  Hb level has now decreased to the upper limit of the female normal range.  The mild polycythemia may be due to the pulmonary effects of her cigarette smoking --Continue to follow.  (She does not intend to stop smoking)  6) Pituitary microadenoma:  Has not had a follow-up MRI in several years because of the cost and her lack of insurance. --Will recheck a prolactin level  To check an A1c and prolactin level next week Discuss TSH results next week over the phone See for follow-up in one year--(sooner if her blood sugars are an issue) CMP, lipids, A1c, TFTs before the visit Discussed her insurance issues.  The insurance which is available at work has an $8000 deductible, so she has not opted for it.   Suggested that she check whether this deductible is only for out-of-network services

## 2024-09-03 NOTE — REVIEW OF SYSTEMS
[Stress] : stress [Fatigue] : no fatigue [Decreased Appetite] : appetite not decreased [Recent Weight Gain (___ Lbs)] : no recent weight gain [Recent Weight Loss (___ Lbs)] : no recent weight loss [Fever] : no fever [Chills] : no chills [Dry Eyes] : no dryness [Blurred Vision] : no blurred vision [Eyes Itch] : no itch [Dysphagia] : no dysphagia [Hearing Loss] : no hearing loss  [Chest Pain] : no chest pain [Palpitations] : no palpitations [Lower Ext Edema] : no lower extremity edema [Shortness Of Breath] : no shortness of breath [Cough] : no cough [Wheezing] : no wheezing [SOB on Exertion] : no shortness of breath on exertion [Nausea] : no nausea [Constipation] : no constipation [Heartburn] : no heartburn [Diarrhea] : no diarrhea [Polyuria] : no polyuria [Dysuria] : no dysuria [Nocturia] : no nocturia [Joint Pain] : no joint pain [Muscle Weakness] : no muscle weakness [Myalgia] : no myalgia  [Joint Stiffness] : no joint stiffness [Dry Skin] : no dry skin [Hair Loss] : no hair loss [Ulcer] : no ulcer [Headaches] : no headaches [Tremors] : no tremors [Pain/Numbness of Digits] : no pain/numbness of digits [Depression] : no depression [Anxiety] : no anxiety [Polydipsia] : no polydipsia [Cold Intolerance] : no cold intolerance [Heat Intolerance] : no heat intolerance [Easy Bleeding] : no ~M tendency for easy bleeding [Easy Bruising] : no tendency for easy bruising [FreeTextEntry7] : Tends to eat multiple small meals 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

## 2024-09-03 NOTE — PHYSICAL EXAM
[Alert] : alert [Well Nourished] : well nourished [Healthy Appearance] : healthy appearance [Normal Sclera/Conjunctiva] : normal sclera/conjunctiva [EOMI] : extra ocular movement intact [PERRL] : pupils equal, round and reactive to light [No Proptosis] : no proptosis [No Lid Lag] : no lid lag [Normal Outer Ear/Nose] : the ears and nose were normal in appearance [Normal Hearing] : hearing was normal [No Neck Mass] : no neck mass was observed [No LAD] : no lymphadenopathy [No Thyroid Nodules] : no palpable thyroid nodules [Clear to Auscultation] : lungs were clear to auscultation bilaterally [No Murmurs] : no murmurs [Normal Rate] : heart rate was normal [Regular Rhythm] : with a regular rhythm [Carotids Normal] : carotid pulses were normal with no bruits [No Edema] : no peripheral edema [Not Tender] : non-tender [Soft] : abdomen soft [No HSM] : no hepato-splenomegaly [Normal Supraclavicular Nodes] : no supraclavicular lymphadenopathy [Normal Anterior Cervical Nodes] : no anterior cervical lymphadenopathy [No CVA Tenderness] : no ~M costovertebral angle tenderness [Normal Gait] : normal gait [No Involuntary Movements] : no involuntary movements were seen [No Joint Swelling] : no joint swelling seen [Normal Strength/Tone] : muscle strength and tone were normal [No Rash] : no rash [No Motor Deficits] : the motor exam was normal [No Tremors] : no tremors [Normal Affect] : the affect was normal [Normal Mood] : the mood was normal [Kyphosis] : no kyphosis present [Acanthosis Nigricans] : no acanthosis nigricans [Foot Ulcers] : no foot ulcers [Hirsutism] : no hirsutism [de-identified] : No corneal arcus.  Visual fields normal to confrontational testing [de-identified] : Thyroid is barely palpable [de-identified] : DP pulses 2+ on the left, only faintly palpable on the right [de-identified] : Area under the MT head of her R foot where she has a foreign body sensation is heavily callused

## 2024-09-03 NOTE — HISTORY OF PRESENT ILLNESS
[FreeTextEntry1] : 53-year-old woman who is followed for hypothyroidism, hypertension, hyperlipidemia and a pituitary microadenoma.  The pituitary lesion is 4 mm in size, non-secretory and has been stable on serial MRIs for the past several years.  Her hypothyroidism is being treated with [place] thyroid, and her hyperlipidemia is sub-optimally controlled with diet.  Her hypertension is somewhat labile, and although she tends to have elevated readings at office visits, her readings at home are now in target range.      She now returns for her yearly visit. Has not had any acute illnesses or significant medical issues in the interim.   Is supposed to be taking the [place] thyroid 6 1/2 pills/week, but is still taking 1 pill 7 days/week.  Says that she would tend to fall asleep in the afternoon on the days when she tried to take half a tablet Taking chlorthalidone 12.5 mg plus losartan 50 mg/day for her hypertension.  Says that most of her BPs (which she checks on a watch) have been < 130/80, and often in the 110-120/65-75 range.  Readings have been higher during the past 2 seeks, but still below 140/83. Diet was reviewed:: --Breakfast is cooked vegetables with 1 egg. --Lunch is a salad with grilled salmon --Supper is usually leftover salad from lunch --Fruit servings average less than one a day, and she does not drink juice Changed her exercise routine.  Stopped running on the treadmill because she developed pain in her hip and upper thigh, also some swelling over the patella.  Is now doing mostly calisthenics (pushups, crunches, etc) plus yoga and exercises with hand weights.

## 2024-09-03 NOTE — HISTORY OF PRESENT ILLNESS
[FreeTextEntry1] : 53-year-old woman who is followed for hypothyroidism, hypertension, hyperlipidemia and a pituitary microadenoma.  The pituitary lesion is 4 mm in size, non-secretory and has been stable on serial MRIs for the past several years.  Her hypothyroidism is being treated with [place] thyroid, and her hyperlipidemia is sub-optimally controlled with diet.  Her hypertension is somewhat labile, and although she tends to have elevated readings at office visits, her readings at home are now in target range.      She now returns for her yearly visit. Has not had any acute illnesses or significant medical issues in the interim.   Is supposed to be taking the [place] thyroid 6 1/2 pills/week, but is still taking 1 pill 7 days/week.  Says that she would tend to fall asleep in the afternoon on the days when she tried to take half a tablet Taking chlorthalidone 12.5 mg plus losartan 50 mg/day for her hypertension.  Says that most of her BPs (which she checks on a watch) have been < 130/80, and often in the 110-120/65-75 range.  Readings have been higher during the past 2 seeks, but still below 140/83. Diet was reviewed:: --Breakfast is cooked vegetables with 1 egg. --Lunch is a salad with grilled salmon --Supper is usually leftover salad from lunch --Fruit servings average less than one a day, and she does not drink juice Changed her exercise routine.  Stopped running on the treadmill because she developed pain in her hip and upper thigh, also some swelling over the patella.  Is now doing mostly calisthenics (pushups, crunches, etc) plus yoga and exercises with hand weights.

## 2024-09-03 NOTE — ADDENDUM
[FreeTextEntry1] : Spoke with the pt on 9/3/24 regarding the TSH result, which was in the suppressed range at 0.09 uU/ml She will decrease back to 1 pill 6 days/week, 1/2 pill on Sunday. (Has already started to do this) Will delay the bloodwork for A1c and PRL for 2 months, and do the repeat TSH with those other tests

## 2024-09-03 NOTE — ASSESSMENT
[FreeTextEntry2] : Implications of elevated FBS, BP targets, LDL target and possible statin therapy [FreeTextEntry1] : 1) Pre-diabetes:  FBS has risen to 125 mg%, actually at the threshold for true diabetes.  No A1c level is available.  Family history is negative.  Her diet is fine, she exercises regularly, and she is non-obese.   --To continue diet and exercise --Will check an A1c level  2) Hyperlipidemia:  She now has a "hard" LDL target of 100 mg% given the pre-diabetes, (although this target was previously justified by her risk factors of hypertension and cigarette smoking).  Her diet leaves almost no room for additional modification, and she is clearly a candidate for statin therapy. --Discussed her LDL target and indication for statin therapy, which she has previously refused.  She says that she will think about it.  3) Hypothyroidism:  TFTs were not done on the current labwork, but were added via a phone call to [facility] last night. --Will await the result of her TSH level, and discuss this with her next week over the phone. --Explained to her that it is very difficult to "fine-tune" her regimen, since the T3 content of the [place] preparation is quite high, and she may feel somewhat different on a day when she takes a lower dose.  4) Hypertension:  BP was again elevated at her office visit, though her readings at home seem to be excellent.  She is monitoring these with her watch, but says that she has confirmed the accuracy of that device by checking it against her regular cuff. --Continue losartan 50 mg/day plus chlorthalidone 12.5 mg/day  5) Polycythemia:  Hb level has now decreased to the upper limit of the female normal range.  The mild polycythemia may be due to the pulmonary effects of her cigarette smoking --Continue to follow.  (She does not intend to stop smoking)  6) Pituitary microadenoma:  Has not had a follow-up MRI in several years because of the cost and her lack of insurance. --Will recheck a prolactin level  To check an A1c and prolactin level next week Discuss TSH results next week over the phone See for follow-up in one year--(sooner if her blood sugars are an issue) CMP, lipids, A1c, TFTs before the visit Discussed her insurance issues.  The insurance which is available at work has an $8000 deductible, so she has not opted for it.   Suggested that she check whether this deductible is only for out-of-network services

## 2024-09-03 NOTE — PHYSICAL EXAM
[Alert] : alert [Well Nourished] : well nourished [Healthy Appearance] : healthy appearance [Normal Sclera/Conjunctiva] : normal sclera/conjunctiva [EOMI] : extra ocular movement intact [PERRL] : pupils equal, round and reactive to light [No Proptosis] : no proptosis [No Lid Lag] : no lid lag [Normal Outer Ear/Nose] : the ears and nose were normal in appearance [Normal Hearing] : hearing was normal [No Neck Mass] : no neck mass was observed [No LAD] : no lymphadenopathy [No Thyroid Nodules] : no palpable thyroid nodules [Clear to Auscultation] : lungs were clear to auscultation bilaterally [No Murmurs] : no murmurs [Normal Rate] : heart rate was normal [Regular Rhythm] : with a regular rhythm [Carotids Normal] : carotid pulses were normal with no bruits [No Edema] : no peripheral edema [Not Tender] : non-tender [Soft] : abdomen soft [No HSM] : no hepato-splenomegaly [Normal Supraclavicular Nodes] : no supraclavicular lymphadenopathy [Normal Anterior Cervical Nodes] : no anterior cervical lymphadenopathy [No CVA Tenderness] : no ~M costovertebral angle tenderness [Normal Gait] : normal gait [No Involuntary Movements] : no involuntary movements were seen [No Joint Swelling] : no joint swelling seen [Normal Strength/Tone] : muscle strength and tone were normal [No Rash] : no rash [No Motor Deficits] : the motor exam was normal [No Tremors] : no tremors [Normal Affect] : the affect was normal [Normal Mood] : the mood was normal [Kyphosis] : no kyphosis present [Acanthosis Nigricans] : no acanthosis nigricans [Foot Ulcers] : no foot ulcers [Hirsutism] : no hirsutism [de-identified] : No corneal arcus.  Visual fields normal to confrontational testing [de-identified] : Thyroid is barely palpable [de-identified] : DP pulses 2+ on the left, only faintly palpable on the right [de-identified] : Area under the MT head of her R foot where she has a foreign body sensation is heavily callused

## 2024-09-03 NOTE — REVIEW OF SYSTEMS
[Stress] : stress [Fatigue] : no fatigue [Decreased Appetite] : appetite not decreased [Recent Weight Gain (___ Lbs)] : no recent weight gain [Recent Weight Loss (___ Lbs)] : no recent weight loss [Fever] : no fever [Chills] : no chills [Dry Eyes] : no dryness [Blurred Vision] : no blurred vision [Eyes Itch] : no itch [Dysphagia] : no dysphagia [Hearing Loss] : no hearing loss  [Chest Pain] : no chest pain [Palpitations] : no palpitations [Lower Ext Edema] : no lower extremity edema [Shortness Of Breath] : no shortness of breath [Cough] : no cough [Wheezing] : no wheezing [SOB on Exertion] : no shortness of breath on exertion [Nausea] : no nausea [Constipation] : no constipation [Heartburn] : no heartburn [Diarrhea] : no diarrhea [Polyuria] : no polyuria [Dysuria] : no dysuria [Nocturia] : no nocturia [Joint Pain] : no joint pain [Muscle Weakness] : no muscle weakness [Myalgia] : no myalgia  [Joint Stiffness] : no joint stiffness [Dry Skin] : no dry skin [Hair Loss] : no hair loss [Ulcer] : no ulcer [Headaches] : no headaches [Tremors] : no tremors [Pain/Numbness of Digits] : no pain/numbness of digits [Depression] : no depression [Anxiety] : no anxiety [Polydipsia] : no polydipsia [Cold Intolerance] : no cold intolerance [Heat Intolerance] : no heat intolerance [Easy Bleeding] : no ~M tendency for easy bleeding [Easy Bruising] : no tendency for easy bruising [FreeTextEntry7] : Tends to eat multiple small meals 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

## 2024-11-06 ENCOUNTER — RX RENEWAL (OUTPATIENT)
Age: 54
End: 2024-11-06

## 2025-05-14 ENCOUNTER — RX RENEWAL (OUTPATIENT)
Age: 55
End: 2025-05-14

## 2025-07-31 ENCOUNTER — RX RENEWAL (OUTPATIENT)
Age: 55
End: 2025-07-31